# Patient Record
Sex: MALE | Race: BLACK OR AFRICAN AMERICAN | Employment: UNEMPLOYED | URBAN - METROPOLITAN AREA
[De-identification: names, ages, dates, MRNs, and addresses within clinical notes are randomized per-mention and may not be internally consistent; named-entity substitution may affect disease eponyms.]

---

## 2019-01-29 PROBLEM — K13.0 CELLULITIS OF LIP: Status: ACTIVE | Noted: 2019-01-29

## 2019-10-02 ENCOUNTER — HOSPITAL ENCOUNTER (OUTPATIENT)
Dept: LAB | Age: 49
Discharge: HOME | End: 2019-10-02
Payer: COMMERCIAL

## 2019-10-02 ENCOUNTER — OFFICE VISIT (OUTPATIENT)
Dept: HEMATOLOGY | Age: 49
End: 2019-10-02

## 2019-10-02 VITALS
SYSTOLIC BLOOD PRESSURE: 122 MMHG | WEIGHT: 191.38 LBS | TEMPERATURE: 96.5 F | HEART RATE: 73 BPM | DIASTOLIC BLOOD PRESSURE: 91 MMHG | OXYGEN SATURATION: 98 % | HEIGHT: 68 IN | BODY MASS INDEX: 29.01 KG/M2

## 2019-10-02 DIAGNOSIS — R76.8 HCV ANTIBODY POSITIVE: Primary | ICD-10-CM

## 2019-10-02 DIAGNOSIS — R76.8 HCV ANTIBODY POSITIVE: ICD-10-CM

## 2019-10-02 PROBLEM — E11.9 TYPE 2 DIABETES MELLITUS (HCC): Status: ACTIVE | Noted: 2019-10-02

## 2019-10-02 PROBLEM — I10 HYPERTENSION: Status: ACTIVE | Noted: 2019-10-02

## 2019-10-02 PROBLEM — K13.0 CELLULITIS OF LIP: Status: RESOLVED | Noted: 2019-01-29 | Resolved: 2019-10-02

## 2019-10-02 PROBLEM — Z87.19 H/O HERNIA REPAIR: Status: ACTIVE | Noted: 2019-10-02

## 2019-10-02 PROBLEM — Z98.890 H/O HERNIA REPAIR: Status: ACTIVE | Noted: 2019-10-02

## 2019-10-02 PROCEDURE — 36415 COLL VENOUS BLD VENIPUNCTURE: CPT

## 2019-10-02 PROCEDURE — 87521 HEPATITIS C PROBE&RVRS TRNSC: CPT

## 2019-10-02 PROCEDURE — 87902 NFCT AGT GNTYP ALYS HEP C: CPT

## 2019-10-02 RX ORDER — GLIMEPIRIDE 2 MG/1
TABLET ORAL
COMMUNITY

## 2019-10-02 NOTE — Clinical Note
10/5/19 Patient: Elsa Osorio YOB: 1970 Date of Visit: 10/2/2019 Tracey Cabrera MD 
[address] [place] [address] VIA Facsimile: [phone] Dear Tracey Cabrera MD, Thank you for referring Mr. Lianne Murray to [facility] for evaluation. My notes for this consultation are attached. If you have questions, please do not hesitate to call me. I look forward to following your patient along with you. Sincerely, Shea Garcia MD

## 2019-10-02 NOTE — PROGRESS NOTES
[address] [facility]\"", Aria STALEY Alane Dunn, MD Clent Ask, PA-C Morrell Gallop, ACNP-BC     April S Elpidio, [facility]-BC   FRANCISCO Gallo [facility]       Angelita Rodriguez [facility] 136    at [address], [address] Benita Peralta  22.    [phone]    FAX: [address] [place] [address], [address]    [phone]    FAX: [phone]       Patient Care Team:  Silvia Vasquez MD as PCP - General (Family Practice)      Problem List  Date Reviewed: 10/2/2019          Codes Class Noted    HCV antibody positive ICD-10-CM: R76.8  ICD-9-CM: 795.79  10/2/2019        Type 2 diabetes mellitus ([facility] ICD-10-CM: E11.9  ICD-9-CM: 250.00  10/2/2019        Hypertension ICD-10-CM: I10  ICD-9-CM: 401.9  10/2/2019        H/O hernia repair ICD-10-CM: Y26.435, Z87.19  ICD-9-CM: V45.89  10/2/2019              The clinicians listed above have asked me to see Coleridge Goltz in consultation regarding chronic HCV and its management. All medical records sent by the referring physicians were reviewed     The patient is a 47 y.o. Black male who was screened for anti-HCV and tested positive in 4/2019. Risk factors for acquiring HCV are inhaling cocaine in 1990 - 2009. There was no history of acute icteric hepatitis at the time of these risk factors. Imaging of the liver was not performed. An assessment of liver fibrosis with biopsy or elastography has not been performed. The patient has never received treatment for chronic HCV. The patient has no symptoms which can be attributed to the liver disorder.     The patient has not experienced the following symptoms:  fatigue, pain in the right side over the liver,     The patient completes all daily activities without any functional limitations. ASSESSMENT AND PLAN:  Chronic HCV   Chronic HCV of unclear severity. Liver transaminases are normal.  ALP is normal.  Liver function is normal.  The platelet count is normal.      Will perform and/or review results of HCV viral load, HCV genotype to define the specific treatment and duration of treatment that will be required. Will perform imaging of the liver with ultrasound. The need to assess liver fibrosis was discussed. Sheer wave elastography can assess liver fibrosis and determine if a patient has advanced fibrosis or cirrhosis without the need for liver biopsy. This will be scheduled. Chronic HCV Treatment  The patient has not been treated for HCV. The patient has HCV genotype 2. The patient should be treated with Mavyret (glecaprevir and piprentasvir) or Epclusa (sofosbuvir and valpitasvir). The SVR/cure rate for is over 95%. Screening for Hepatocellular Carcinoma  HCC screening is not necessary if the patient has no evidence of cirrhosis. Treatment of other medical problems in patients with chronic liver disease  There are no contraindications for the patient to take most medications that are necessary for treatment of other medical issues. Counseling for alcohol in patients with chronic liver disease  The patient was counseled regarding alcohol consumption and the effect of alcohol on chronic liver disease. The patient does not consume any significant amount of alcohol. Drug use  The patient was counseled regarding the risk of overdose and death from using narcotic drugs and the risk of becoming reinfected with HCV once they are cured if they resume narcotic drug use. The patient has not used drugs since 2009.     Vaccinations   Vaccination for viral hepatitis A is recommended since the patient has no serologic evidence of previous exposure or vaccination with immunity. Vaccination for viral hepatitis B is not needed. The patient has serologic evidence of prior exposure or vaccination with immunity. Routine vaccinations against other bacterial and viral agents can be performed as indicated. Annual flu vaccination should be administered if indicated. ALLERGIES  No Known Allergies    MEDICATIONS  Current Outpatient Medications   Medication Sig    glimepiride (AMARYL) 2 mg tablet Take  by mouth every morning.  metFORMIN (GLUCOPHAGE) 500 mg tablet Take 1 Tab by mouth two (2) times daily (with meals). No current facility-administered medications for this visit. SYSTEM REVIEW NOT RELATED TO LIVER DISEASE OR REVIEWED ABOVE:  Constitution systems: Negative for fever, chills, weight gain, weight loss. Eyes: Negative for visual changes. ENT: Negative for sore throat, painful swallowing. Respiratory: Negative for cough, hemoptysis, SOB. Cardiology: Negative for chest pain, palpitations. GI:  Negative for constipation or diarrhea. : Negative for urinary frequency, dysuria, hematuria, nocturia. Skin: Negative for rash. Hematology: Negative for easy bruising, blood clots. Musculo-skelatal: Negative for back pain, muscle pain, weakness. Neurologic: Negative for headaches, dizziness, vertigo, memory problems not related to HE. Psychology: Negative for anxiety, depression. FAMILY HISTORY:  The father  of MI. The mother Has/had the following chronic disease(s): dementia. There is no family history of liver disease. SOCIAL HISTORY:  The patient is  to same sex partner  The spouse has been tested for HCV   The patient has 1 child. The patient stopped using tobacco products in . The patient consumes 1-2 alcoholic beverages ever 1-2 weeks. The patient used to work in as a . The patient has not worked since .         PHYSICAL EXAMINATION:  Visit Vitals  BP (!) 122/91   Pulse 73   Temp 96.5 °F (35.8 °C) (Tympanic)   Ht 5' 8\" (1.727 m)   Wt 191 lb 6 oz (86.8 kg)   SpO2 98%   BMI 29.10 kg/m²     General: No acute distress. Eyes: Sclera anicteric. ENT: No oral lesions. Thyroid normal.  Nodes: No adenopathy. Skin: No spider angiomata. No jaundice. No palmar erythema. Respiratory: Lungs clear to auscultation. Cardiovascular: Regular heart rate. No murmurs. No JVD. Abdomen: Soft non-tender. Liver size normal to percussion/palpation. Spleen not palpable. No obvious ascites. Extremities: No edema. No muscle wasting. No gross arthritic changes. Neurologic: Alert and oriented. Cranial nerves grossly intact. No asterixis. LABORATORY STUDIES:  From 4/2019  AST/ALT/ALP/T Bili/ALB:  20/28/55/0.3/4.7  BUN/CREAT:  9/0.84    SEROLOGIES:  4/2019. HAV total negative, HBsurface antigen negative, anti-HBcore positive, anti-HBsurface positive, anti-HCV positive,   10/2019. HCV genotype 2, HCV RNA Log 6.2 intU    LIVER HISTOLOGY:  Not available or performed    ENDOSCOPIC PROCEDURES:  Not available or performed    RADIOLOGY:  Not available or performed    OTHER TESTING:  Not available or performed    FOLLOW-UP:  All of the issues listed above in the Assessment and Plan were discussed with the patient. All questions were answered. The patient expressed a clear understanding of the above. [facility] 87 in 4 weeks for elastography and to initiate HCV treatment.     Neno Wharton MD  [address]  [address] [place] [address] Miriam Lara, [address]  [facility]

## 2019-10-04 LAB
HCV RNA SERPL NAA+PROBE-LOG IU: 6.24 HCV LOG 10IU/ML
HCV RNA SERPL PROBE AMP-ACNC: ABNORMAL HCVIU/ML
HCV RNA SERPL QL NAA+PROBE: POSITIVE

## 2019-10-05 LAB
HCV GENTYP SERPL NAA+PROBE: NORMAL
PLEASE NOTE, 550474: NORMAL

## 2019-10-08 DIAGNOSIS — B18.2 CHRONIC HEPATITIS C WITHOUT HEPATIC COMA (HCC): Primary | ICD-10-CM
